# Patient Record
Sex: MALE | Race: WHITE | NOT HISPANIC OR LATINO | Employment: OTHER | URBAN - NONMETROPOLITAN AREA
[De-identification: names, ages, dates, MRNs, and addresses within clinical notes are randomized per-mention and may not be internally consistent; named-entity substitution may affect disease eponyms.]

---

## 2021-08-02 ENCOUNTER — APPOINTMENT (EMERGENCY)
Dept: CT IMAGING | Facility: HOSPITAL | Age: 61
End: 2021-08-02
Payer: COMMERCIAL

## 2021-08-02 ENCOUNTER — HOSPITAL ENCOUNTER (EMERGENCY)
Facility: HOSPITAL | Age: 61
Discharge: HOME | End: 2021-08-02
Attending: EMERGENCY MEDICINE
Payer: COMMERCIAL

## 2021-08-02 VITALS
RESPIRATION RATE: 20 BRPM | SYSTOLIC BLOOD PRESSURE: 155 MMHG | TEMPERATURE: 97.2 F | DIASTOLIC BLOOD PRESSURE: 74 MMHG | HEART RATE: 74 BPM | HEIGHT: 69 IN | OXYGEN SATURATION: 99 % | BODY MASS INDEX: 25.53 KG/M2 | WEIGHT: 172.4 LBS

## 2021-08-02 DIAGNOSIS — R91.1 LUNG NODULE SEEN ON IMAGING STUDY: ICD-10-CM

## 2021-08-02 DIAGNOSIS — R59.0 AXILLARY ADENOPATHY: ICD-10-CM

## 2021-08-02 DIAGNOSIS — I10 ESSENTIAL HYPERTENSION: ICD-10-CM

## 2021-08-02 DIAGNOSIS — C79.9 METASTATIC CANCER (HCC): ICD-10-CM

## 2021-08-02 DIAGNOSIS — M51.36 DDD (DEGENERATIVE DISC DISEASE), LUMBAR: ICD-10-CM

## 2021-08-02 DIAGNOSIS — R10.9 FLANK PAIN: Primary | ICD-10-CM

## 2021-08-02 DIAGNOSIS — M16.10 HIP ARTHRITIS: ICD-10-CM

## 2021-08-02 LAB
ALBUMIN SERPL BCP-MCNC: 3 G/DL (ref 3.5–5)
ALP SERPL-CCNC: 143 U/L (ref 46–116)
ALT SERPL W P-5'-P-CCNC: 21 U/L (ref 12–78)
ANION GAP SERPL CALCULATED.3IONS-SCNC: 10 MMOL/L (ref 4–13)
AST SERPL W P-5'-P-CCNC: 54 U/L (ref 5–45)
BASOPHILS # BLD AUTO: 0.03 THOUSANDS/ΜL (ref 0–0.1)
BASOPHILS NFR BLD AUTO: 1 % (ref 0–1)
BILIRUB SERPL-MCNC: 0.61 MG/DL (ref 0.2–1)
BILIRUB UR QL STRIP: NEGATIVE
BUN SERPL-MCNC: 10 MG/DL (ref 5–25)
CALCIUM ALBUM COR SERPL-MCNC: 10 MG/DL (ref 8.3–10.1)
CALCIUM SERPL-MCNC: 9.2 MG/DL (ref 8.3–10.1)
CHLORIDE SERPL-SCNC: 99 MMOL/L (ref 100–108)
CLARITY UR: CLEAR
CO2 SERPL-SCNC: 26 MMOL/L (ref 21–32)
COLOR UR: YELLOW
CREAT SERPL-MCNC: 0.73 MG/DL (ref 0.6–1.3)
EOSINOPHIL # BLD AUTO: 0.13 THOUSAND/ΜL (ref 0–0.61)
EOSINOPHIL NFR BLD AUTO: 2 % (ref 0–6)
ERYTHROCYTE [DISTWIDTH] IN BLOOD BY AUTOMATED COUNT: 12.7 % (ref 11.6–15.1)
GFR SERPL CREATININE-BSD FRML MDRD: 99 ML/MIN/1.73SQ M
GLUCOSE SERPL-MCNC: 104 MG/DL (ref 65–140)
GLUCOSE UR STRIP-MCNC: NEGATIVE MG/DL
HCT VFR BLD AUTO: 39.6 % (ref 36.5–49.3)
HGB BLD-MCNC: 13 G/DL (ref 12–17)
HGB UR QL STRIP.AUTO: NEGATIVE
IMM GRANULOCYTES # BLD AUTO: 0.02 THOUSAND/UL (ref 0–0.2)
IMM GRANULOCYTES NFR BLD AUTO: 0 % (ref 0–2)
KETONES UR STRIP-MCNC: NEGATIVE MG/DL
LEUKOCYTE ESTERASE UR QL STRIP: NEGATIVE
LIPASE SERPL-CCNC: 97 U/L (ref 73–393)
LYMPHOCYTES # BLD AUTO: 1.4 THOUSANDS/ΜL (ref 0.6–4.47)
LYMPHOCYTES NFR BLD AUTO: 21 % (ref 14–44)
MAGNESIUM SERPL-MCNC: 2.2 MG/DL (ref 1.6–2.6)
MCH RBC QN AUTO: 30.1 PG (ref 26.8–34.3)
MCHC RBC AUTO-ENTMCNC: 32.8 G/DL (ref 31.4–37.4)
MCV RBC AUTO: 92 FL (ref 82–98)
MONOCYTES # BLD AUTO: 0.58 THOUSAND/ΜL (ref 0.17–1.22)
MONOCYTES NFR BLD AUTO: 9 % (ref 4–12)
NEUTROPHILS # BLD AUTO: 4.46 THOUSANDS/ΜL (ref 1.85–7.62)
NEUTS SEG NFR BLD AUTO: 67 % (ref 43–75)
NITRITE UR QL STRIP: NEGATIVE
NRBC BLD AUTO-RTO: 0 /100 WBCS
PH UR STRIP.AUTO: 6.5 [PH]
PLATELET # BLD AUTO: 190 THOUSANDS/UL (ref 149–390)
PMV BLD AUTO: 10.3 FL (ref 8.9–12.7)
POTASSIUM SERPL-SCNC: 5.2 MMOL/L (ref 3.5–5.3)
PROT SERPL-MCNC: 8.4 G/DL (ref 6.4–8.2)
PROT UR STRIP-MCNC: NEGATIVE MG/DL
RBC # BLD AUTO: 4.32 MILLION/UL (ref 3.88–5.62)
SODIUM SERPL-SCNC: 135 MMOL/L (ref 136–145)
SP GR UR STRIP.AUTO: <=1.005 (ref 1–1.03)
UROBILINOGEN UR QL STRIP.AUTO: 0.2 E.U./DL
WBC # BLD AUTO: 6.62 THOUSAND/UL (ref 4.31–10.16)

## 2021-08-02 PROCEDURE — 85025 COMPLETE CBC W/AUTO DIFF WBC: CPT | Performed by: EMERGENCY MEDICINE

## 2021-08-02 PROCEDURE — 96361 HYDRATE IV INFUSION ADD-ON: CPT

## 2021-08-02 PROCEDURE — 96374 THER/PROPH/DIAG INJ IV PUSH: CPT

## 2021-08-02 PROCEDURE — 36415 COLL VENOUS BLD VENIPUNCTURE: CPT | Performed by: EMERGENCY MEDICINE

## 2021-08-02 PROCEDURE — 81003 URINALYSIS AUTO W/O SCOPE: CPT | Performed by: EMERGENCY MEDICINE

## 2021-08-02 PROCEDURE — G1004 CDSM NDSC: HCPCS

## 2021-08-02 PROCEDURE — 96375 TX/PRO/DX INJ NEW DRUG ADDON: CPT

## 2021-08-02 PROCEDURE — 74177 CT ABD & PELVIS W/CONTRAST: CPT

## 2021-08-02 PROCEDURE — 99285 EMERGENCY DEPT VISIT HI MDM: CPT | Performed by: EMERGENCY MEDICINE

## 2021-08-02 PROCEDURE — 83690 ASSAY OF LIPASE: CPT | Performed by: EMERGENCY MEDICINE

## 2021-08-02 PROCEDURE — 83735 ASSAY OF MAGNESIUM: CPT | Performed by: EMERGENCY MEDICINE

## 2021-08-02 PROCEDURE — 80053 COMPREHEN METABOLIC PANEL: CPT | Performed by: EMERGENCY MEDICINE

## 2021-08-02 PROCEDURE — 99284 EMERGENCY DEPT VISIT MOD MDM: CPT

## 2021-08-02 RX ORDER — LIDOCAINE 50 MG/G
2 PATCH TOPICAL ONCE
Status: DISCONTINUED | OUTPATIENT
Start: 2021-08-02 | End: 2021-08-02 | Stop reason: HOSPADM

## 2021-08-02 RX ORDER — ONDANSETRON 2 MG/ML
4 INJECTION INTRAMUSCULAR; INTRAVENOUS ONCE
Status: COMPLETED | OUTPATIENT
Start: 2021-08-02 | End: 2021-08-02

## 2021-08-02 RX ORDER — HYDRALAZINE HYDROCHLORIDE 20 MG/ML
10 INJECTION INTRAMUSCULAR; INTRAVENOUS ONCE
Status: COMPLETED | OUTPATIENT
Start: 2021-08-02 | End: 2021-08-02

## 2021-08-02 RX ORDER — OXYCODONE HYDROCHLORIDE AND ACETAMINOPHEN 5; 325 MG/1; MG/1
1 TABLET ORAL EVERY 4 HOURS PRN
Qty: 8 TABLET | Refills: 0 | Status: SHIPPED | OUTPATIENT
Start: 2021-08-02 | End: 2021-08-12

## 2021-08-02 RX ORDER — KETOROLAC TROMETHAMINE 30 MG/ML
15 INJECTION, SOLUTION INTRAMUSCULAR; INTRAVENOUS ONCE
Status: COMPLETED | OUTPATIENT
Start: 2021-08-02 | End: 2021-08-02

## 2021-08-02 RX ORDER — LABETALOL 20 MG/4 ML (5 MG/ML) INTRAVENOUS SYRINGE
10 ONCE
Status: COMPLETED | OUTPATIENT
Start: 2021-08-02 | End: 2021-08-02

## 2021-08-02 RX ADMIN — IOHEXOL 100 ML: 350 INJECTION, SOLUTION INTRAVENOUS at 09:56

## 2021-08-02 RX ADMIN — LABETALOL 20 MG/4 ML (5 MG/ML) INTRAVENOUS SYRINGE 10 MG: at 10:08

## 2021-08-02 RX ADMIN — LIDOCAINE 2 PATCH: 50 PATCH TOPICAL at 09:27

## 2021-08-02 RX ADMIN — ONDANSETRON 4 MG: 2 INJECTION INTRAMUSCULAR; INTRAVENOUS at 09:27

## 2021-08-02 RX ADMIN — SODIUM CHLORIDE 1000 ML: 0.9 INJECTION, SOLUTION INTRAVENOUS at 09:25

## 2021-08-02 RX ADMIN — HYDRALAZINE HYDROCHLORIDE 10 MG: 20 INJECTION INTRAMUSCULAR; INTRAVENOUS at 11:16

## 2021-08-02 RX ADMIN — KETOROLAC TROMETHAMINE 15 MG: 30 INJECTION, SOLUTION INTRAMUSCULAR at 09:25

## 2021-08-02 NOTE — ED PROVIDER NOTES
History  Chief Complaint   Patient presents with    Flank Pain     pt c/o worsening bilateral flank pain radiating to abdomen, frequent urination, nausea, and fatigue for over 2 wks  pt mentioned slipping on a step landing on back 2wks ago with continued flank pain  pt taking tylenol and advil w/o relief  denies travel/sob/fevers/cough/v/d      27-year-old male with a past medical history of hypertension presents with 2 months of bilateral low back pain that radiates to his bilateral flanks, right and left lower quadrant and to groin area at times  Patient states he has had been having worsening low back pain after falling 2 weeks ago and landing on his low back, no head strike  Patient also has had associated nausea and fatigue intermittently with frequent urination over the last 2 weeks  Patient states that he just got insurance yesterday and has a scheduled primary care physician appointment on August 4th but came to the emergency department today due to pain being 8/10  Patient has been taking Tylenol and Motrin at home without improvement  Patient denies previous history of back injury or surgery  No history of cancer, IV drug use, nephrolithiasis, pyelonephritis, diverticulitis,  pathology, abdominal aortic aneurysm  Patient denies recent fall, trauma or exertional activity  Patient denies fever, chills, dizziness, diaphoresis, loss of taste or smell, headache, neck stiffness, sore throat, cough, sputum production, vomiting, chest pain, shortness of breath, rash, penile discharge, scrotal swelling, focal motor or sensory deficits, lower extremity swelling or pain, diarrhea, bloody stools or constipation  Denies saddle anesthesia, urinary retention, urinary or fecal incontinence  Review of medical chart shows no recent hospitalizations  Patient is not on any medications          History provided by:  Patient and medical records   used: No    Flank Pain  Pain location:  L flank and R flank  Pain quality: pressure, sharp and stabbing    Pain radiates to:  R flank, groin, LUQ, RUQ and L flank  Pain severity:  Moderate  Onset quality:  Gradual  Duration:  8 weeks  Timing:  Intermittent  Progression:  Worsening  Chronicity:  Recurrent  Context: awakening from sleep    Context: not alcohol use, not diet changes, not eating, not laxative use, not medication withdrawal, not previous surgeries, not recent illness, not recent sexual activity, not recent travel, not retching, not sick contacts, not suspicious food intake and not trauma    Relieved by:  Nothing  Worsened by:  Nothing  Ineffective treatments:  Acetaminophen and NSAIDs  Associated symptoms: fatigue and nausea    Associated symptoms: no anorexia, no belching, no chest pain, no chills, no constipation, no cough, no diarrhea, no dysuria, no fever, no flatus, no hematemesis, no hematochezia, no hematuria, no melena, no shortness of breath, no sore throat and no vomiting    Fatigue:     Severity:  Unable to specify    Timing:  Unable to specify    Progression:  Unable to specify  Nausea:     Severity:  Unable to specify    Onset quality:  Unable to specify    Timing:  Unable to specify    Progression:  Unable to specify  Risk factors: being elderly and NSAID use    Risk factors: no alcohol abuse, no aspirin use, has not had multiple surgeries, not obese and no recent hospitalization        None       History reviewed  No pertinent past medical history  History reviewed  No pertinent surgical history  History reviewed  No pertinent family history  I have reviewed and agree with the history as documented      E-Cigarette/Vaping    E-Cigarette Use Never User      E-Cigarette/Vaping Substances     Social History     Tobacco Use    Smoking status: Never Smoker    Smokeless tobacco: Never Used   Vaping Use    Vaping Use: Never used   Substance Use Topics    Alcohol use: Not Currently    Drug use: Never       Review of Systems Constitutional: Positive for fatigue  Negative for chills, diaphoresis and fever  HENT: Negative  Negative for sore throat  Eyes: Negative  Respiratory: Negative for cough, chest tightness, shortness of breath and wheezing  Cardiovascular: Negative for chest pain, palpitations and leg swelling  Gastrointestinal: Positive for abdominal pain and nausea  Negative for anorexia, constipation, diarrhea, flatus, hematemesis, hematochezia, melena and vomiting  Endocrine: Negative  Genitourinary: Positive for frequency  Negative for decreased urine volume, difficulty urinating, discharge, dysuria, flank pain, hematuria, penile pain, penile swelling, scrotal swelling, testicular pain and urgency  Musculoskeletal: Positive for back pain  Negative for arthralgias, myalgias, neck pain and neck stiffness  Skin: Negative for rash  Allergic/Immunologic: Negative  Neurological: Negative for dizziness, tremors, seizures, syncope, facial asymmetry, speech difficulty, weakness, light-headedness, numbness and headaches  Hematological: Negative  Psychiatric/Behavioral: Negative for suicidal ideas  Physical Exam  Physical Exam  Vitals and nursing note reviewed  Exam conducted with a chaperone present  Constitutional:       General: He is not in acute distress  Appearance: Normal appearance  He is well-developed and normal weight  He is not ill-appearing, toxic-appearing or diaphoretic  Comments: Well appearing, in no acute distress   HENT:      Head: Normocephalic and atraumatic  Jaw: There is normal jaw occlusion  Right Ear: Hearing, tympanic membrane, ear canal and external ear normal  There is no impacted cerumen  No mastoid tenderness  No hemotympanum  Left Ear: Hearing, tympanic membrane, ear canal and external ear normal  There is no impacted cerumen  No mastoid tenderness  No hemotympanum  Nose: Nose normal       Right Nostril: No epistaxis        Left Nostril: No epistaxis  Right Sinus: No maxillary sinus tenderness or frontal sinus tenderness  Left Sinus: No maxillary sinus tenderness or frontal sinus tenderness  Mouth/Throat:      Lips: Pink  No lesions  Mouth: Mucous membranes are moist  No lacerations or angioedema  Tongue: No lesions  Tongue does not deviate from midline  Palate: No mass and lesions  Pharynx: Oropharynx is clear  Uvula midline  No pharyngeal swelling, oropharyngeal exudate, posterior oropharyngeal erythema or uvula swelling  Tonsils: No tonsillar exudate or tonsillar abscesses  Eyes:      General: Lids are normal  Vision grossly intact  Gaze aligned appropriately  No visual field deficit or scleral icterus  Right eye: No discharge  Left eye: No discharge  Extraocular Movements: Extraocular movements intact  Right eye: No nystagmus  Left eye: No nystagmus  Conjunctiva/sclera: Conjunctivae normal       Right eye: Right conjunctiva is not injected  Left eye: Left conjunctiva is not injected  Pupils: Pupils are equal, round, and reactive to light  Neck:      Thyroid: No thyroid mass or thyromegaly  Trachea: Trachea and phonation normal    Cardiovascular:      Rate and Rhythm: Normal rate and regular rhythm  Pulses: Normal pulses  Radial pulses are 2+ on the right side and 2+ on the left side  Dorsalis pedis pulses are 2+ on the right side and 2+ on the left side  Heart sounds: Normal heart sounds, S1 normal and S2 normal    Pulmonary:      Effort: Pulmonary effort is normal  No tachypnea, accessory muscle usage, respiratory distress or retractions  Breath sounds: Normal breath sounds and air entry  No stridor or decreased air movement  No decreased breath sounds, wheezing, rhonchi or rales  Chest:      Chest wall: No tenderness  Abdominal:      General: Abdomen is flat  Bowel sounds are normal  There is no distension  Palpations: Abdomen is soft  Abdomen is not rigid  There is no mass  Tenderness: There is no abdominal tenderness  There is right CVA tenderness and left CVA tenderness  There is no guarding or rebound  Negative signs include Collins's sign and McBurney's sign  Hernia: A hernia is present  There is no hernia in the left inguinal area or right inguinal area  Genitourinary:     Penis: Normal and circumcised  Testes: Normal    Musculoskeletal:         General: Tenderness present  No swelling, deformity or signs of injury  Normal range of motion  Cervical back: Normal, full passive range of motion without pain, normal range of motion and neck supple  No rigidity  No spinous process tenderness or muscular tenderness  Thoracic back: Normal       Lumbar back: Spasms and tenderness present  No swelling, edema, deformity, signs of trauma, lacerations or bony tenderness  Normal range of motion  Negative right straight leg raise test and negative left straight leg raise test  No scoliosis  Back:       Right lower leg: No edema  Left lower leg: No edema  Comments: Reproducible tenderness to bilateral CVA and bilateral low back with no overlying skin discoloration, erythema, bruising, ecchymosis; no spinal tenderness, crepitus or step-off; no tenderness to sacroiliac joint spaces bilaterally with negative straight leg test bilaterally; no mass or fluctuance   Lymphadenopathy:      Cervical: No cervical adenopathy  Lower Body: No right inguinal adenopathy  No left inguinal adenopathy  Skin:     General: Skin is warm and dry  Capillary Refill: Capillary refill takes less than 2 seconds  Coloration: Skin is not ashen, cyanotic, jaundiced, mottled, pale or sallow  Findings: No abrasion, abscess, acne, bruising, burn, ecchymosis, erythema, signs of injury, laceration, lesion, petechiae, rash or wound  Rash is not macular or papular     Neurological:      General: No focal deficit present  Mental Status: He is alert and oriented to person, place, and time  Mental status is at baseline  He is not disoriented  GCS: GCS eye subscore is 4  GCS verbal subscore is 5  GCS motor subscore is 6  Cranial Nerves: Cranial nerves are intact  No cranial nerve deficit, dysarthria or facial asymmetry  Sensory: Sensation is intact  No sensory deficit  Motor: Motor function is intact  No weakness, tremor, atrophy, abnormal muscle tone or seizure activity  Coordination: Coordination is intact  Coordination normal       Gait: Gait is intact  Gait normal       Comments: Patient is AAOx4, GCS 15; speaking clearly and appropriately; motor and sensation intact; visual fields intact; cranial nerves II-XII grossly intact; no facial droop, slurred speech or arm drift   Psychiatric:         Attention and Perception: Attention and perception normal  He is attentive  Mood and Affect: Mood and affect normal          Speech: Speech normal          Behavior: Behavior normal  Behavior is cooperative  Thought Content:  Thought content normal          Cognition and Memory: Cognition and memory normal          Judgment: Judgment normal          Vital Signs  ED Triage Vitals [08/02/21 0901]   Temperature Pulse Respirations Blood Pressure SpO2   (!) 97 2 °F (36 2 °C) 89 17 (!) 214/115 99 %      Temp Source Heart Rate Source Patient Position - Orthostatic VS BP Location FiO2 (%)   Temporal Monitor Lying Left arm --      Pain Score       9           Vitals:    08/02/21 1015 08/02/21 1030 08/02/21 1100 08/02/21 1151   BP: (!) 186/91 (!) 188/84 (!) 171/98 155/74   Pulse: 68 71 75 74   Patient Position - Orthostatic VS:   Lying Lying         Visual Acuity      ED Medications  Medications   ondansetron (ZOFRAN) injection 4 mg (4 mg Intravenous Given 8/2/21 0927)   sodium chloride 0 9 % bolus 1,000 mL (0 mL Intravenous Stopped 8/2/21 1055)   ketorolac (TORADOL) injection 15 mg (15 mg Intravenous Given 8/2/21 0925)   Labetalol HCl (NORMODYNE) injection 10 mg (10 mg Intravenous Given 8/2/21 1008)   iohexol (OMNIPAQUE) 350 MG/ML injection (SINGLE-DOSE) 100 mL (100 mL Intravenous Given 8/2/21 0956)   hydrALAZINE (APRESOLINE) injection 10 mg (10 mg Intravenous Given 8/2/21 1116)       Diagnostic Studies  Results Reviewed     Procedure Component Value Units Date/Time    Comprehensive metabolic panel [[id number]]  (Abnormal) Collected: 08/02/21 0921    Lab Status: Final result Specimen: Blood from Arm, Right Updated: 08/02/21 0951     Sodium 135 mmol/L      Potassium 5 2 mmol/L      Chloride 99 mmol/L      CO2 26 mmol/L      ANION GAP 10 mmol/L      BUN 10 mg/dL      Creatinine 0 73 mg/dL      Glucose 104 mg/dL      Calcium 9 2 mg/dL      Corrected Calcium 10 0 mg/dL      AST 54 U/L      ALT 21 U/L      Alkaline Phosphatase 143 U/L      Total Protein 8 4 g/dL      Albumin 3 0 g/dL      Total Bilirubin 0 61 mg/dL      eGFR 99 ml/min/1 73sq m     Narrative:      [place] guidelines for Chronic Kidney Disease (CKD):     Stage 1 with normal or high GFR (GFR > 90 mL/min/1 73 square meters)    Stage 2 Mild CKD (GFR = 60-89 mL/min/1 73 square meters)    Stage 3A Moderate CKD (GFR = 45-59 mL/min/1 73 square meters)    Stage 3B Moderate CKD (GFR = 30-44 mL/min/1 73 square meters)    Stage 4 Severe CKD (GFR = 15-29 mL/min/1 73 square meters)    Stage 5 End Stage CKD (GFR <15 mL/min/1 73 square meters)  Note: GFR calculation is accurate only with a steady state creatinine    Lipase [[id number]]  (Normal) Collected: 08/02/21 0921    Lab Status: Final result Specimen: Blood from Arm, Right Updated: 08/02/21 0951     Lipase 97 u/L     Magnesium [[id number]]  (Normal) Collected: 08/02/21 0921    Lab Status: Final result Specimen: Blood from Arm, Right Updated: 08/02/21 0951     Magnesium 2 2 mg/dL     UA w Reflex to Microscopic w Reflex to Culture [[id number]] Collected: 08/02/21 4381 Lab Status: Final result Specimen: Urine, Clean Catch Updated: 08/02/21 0928     Color, UA Yellow     Clarity, UA Clear     Specific Gravity, UA <=1 005     pH, UA 6 5     Leukocytes, UA Negative     Nitrite, UA Negative     Protein, UA Negative mg/dl      Glucose, UA Negative mg/dl      Ketones, UA Negative mg/dl      Urobilinogen, UA 0 2 E U /dl      Bilirubin, UA Negative     Blood, UA Negative    CBC and differential [[id number]] Collected: 08/02/21 0921    Lab Status: Final result Specimen: Blood from Arm, Right Updated: 08/02/21 0927     WBC 6 62 Thousand/uL      RBC 4 32 Million/uL      Hemoglobin 13 0 g/dL      Hematocrit 39 6 %      MCV 92 fL      MCH 30 1 pg      MCHC 32 8 g/dL      RDW 12 7 %      MPV 10 3 fL      Platelets 273 Thousands/uL      nRBC 0 /100 WBCs      Neutrophils Relative 67 %      Immat GRANS % 0 %      Lymphocytes Relative 21 %      Monocytes Relative 9 %      Eosinophils Relative 2 %      Basophils Relative 1 %      Neutrophils Absolute 4 46 Thousands/µL      Immature Grans Absolute 0 02 Thousand/uL      Lymphocytes Absolute 1 40 Thousands/µL      Monocytes Absolute 0 58 Thousand/µL      Eosinophils Absolute 0 13 Thousand/µL      Basophils Absolute 0 03 Thousands/µL                  CT Abdomen pelvis with contrast   Final Result by Luster Collet, MD (08/02 1057)      Patient has widespread metastatic disease involving lungs, liver, and intra-abdominal and retroperitoneal lymph nodes and body wall implants and 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  Tissue sampling is suggested for definitive diagnosis  Mild nonspecific thickening of the wall of the rectosigmoid colon, degenerative arthritis of the hips and spine as additional incidental findings               I personally discussed this study with Denise Weber on 8/2/2021 at 10:57 AM                      Workstation performed: [id number]         CT recon only lumbar spine   Final Result by Luster Collet, MD (08/02 1100)      Degenerative changes of several disks and facet joints without critical central canal or compressive neural foraminal stenosis  No lytic or blastic bone lesions or fractures are seen  Workstation performed: [id number]                    Procedures  Procedures         ED Course  ED Course as of Aug 03 0850   Mon Aug 02, 2021   1058 Texted Dr Kashmir Cruz, Hospitalist to ask whether we can admit patient here to start cancer work-up or should I transfer out now  1107 CT lumbar recon:IMPRESSION:     Degenerative changes of several disks and facet joints without critical central canal or compressive neural foraminal stenosis  No lytic or blastic bone lesions or fractures are seen  1107 CTAP:IMPRESSION:     Patient has widespread metastatic disease involving lungs, liver, and intra-abdominal and retroperitoneal lymph nodes and body wall implants and 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  Tissue sampling is suggested for definitive diagnosis      Mild nonspecific thickening of the wall of the rectosigmoid colon, degenerative arthritis of the hips and spine as additional incidental findings            I personally discussed this study with Sherlyn Shelley on 8/2/2021 at 10:57 AM                   1147 Reassessed patient  He has no complaints at this time  Reviewed labs and imaging  Had lengthy conversation with patient regarding his concerning findings on CT abdomen pelvis for metastatic cancer  Offered option for transfer to hospital for higher level of care and oncology evaluation  Patient declined and would like to go home and discuss options with his primary care provider with whom he has an appointment on August 4th  Patient is requesting referral to medical oncologist locally  Patient is alert and oriented x4 and has capacity to make this decision    As patient is not critically ill or hemodynamically unstable at this time, option for discharge is reasonable and patient does not need to sign out against medical advice  Patient was informed that his situation is serious and that he should not delay in follow-up with oncologist   Will provide script for Percocet as patient has persistent pain now identified likely secondary to his metastatic cancer  Family member will drive patient home  Plan for discharge with primary care provider follow-up  Work note provided  Strict return to ER precautions discussed with and acknowledged by patient  SBIRT 20yo+      Most Recent Value   SBIRT (24 yo +)   In order to provide better care to our patients, we are screening all of our patients for alcohol and drug use  Would it be okay to ask you these screening questions?   No Filed at: 08/02/2021 0905                    [facility]  Number of Diagnoses or Management Options     Amount and/or Complexity of Data Reviewed  Clinical lab tests: reviewed and ordered  Tests in the radiology section of CPT®: reviewed and ordered  Tests in the medicine section of CPT®: ordered and reviewed  Review and summarize past medical records: yes  Discuss the patient with other providers: yes (Hospitalist, Dr Sierra Melendrez)  Independent visualization of images, tracings, or specimens: yes (CTAP)    Risk of Complications, Morbidity, and/or Mortality  Presenting problems: moderate  Diagnostic procedures: moderate  Management options: moderate    Patient Progress  Patient progress: stable      Disposition  Final diagnoses:   Flank pain   Essential hypertension   Metastatic cancer ([facility] [address] )   Lung nodule seen on imaging study   Axillary adenopathy   DDD (degenerative disc disease), lumbar   Hip arthritis     Time reflects when diagnosis was documented in both MDM as applicable and the Disposition within this note     Time User Action Codes Description Comment    8/2/2021 11:08 AM Robert Mondragon Add [R10 9] Flank pain     8/2/2021 11:08 AM Robert Mondragon Add [I10] Essential hypertension 8/2/2021 11:08 AM Rochele Felisha Add [C79 9] Metastatic cancer ([address] )     8/2/2021 11:33 AM Rochele Felisha Add [R91 1] Lung nodule seen on imaging study     8/2/2021 11:34 AM Rochele Felisha Add [R59 0] Axillary adenopathy     8/2/2021 11:34 AM Rochele Felisha Add [M51 36] DDD (degenerative disc disease), lumbar     8/2/2021 11:35 AM Rochele Felisha Add [M16 10] Hip arthritis       ED Disposition     ED Disposition Condition Date/Time Comment    Discharge Stable Mon Aug 2, 2021 11:33 AM Dean Colón discharge to home/self care  Follow-up Information     Follow up With Specialties Details Why Jay Ford 20, DO Internal Medicine Go on 8/4/2021 Please follow-up with your primary care provider on August 4th to discuss your CT scan findings concerning for metastatic cancer and to have your blood pressure evaluated for hypertension   [address]  [phone]            Discharge Medication List as of 8/2/2021 11:39 AM      START taking these medications    Details   oxyCODONE-acetaminophen (PERCOCET) 5-325 mg per tablet Take 1 tablet by mouth every 4 (four) hours as needed for moderate pain for up to 10 daysMax Daily Amount: 6 tablets, Starting Mon 8/2/2021, Until Thu 8/12/2021 at 2359, Normal               PDMP Review       Value Time User    PDMP Reviewed  Yes 8/2/2021 11:35 AM Heather Li MD          ED Provider  Electronically Signed by      MD Heather Vazquez MD  08/03/21 7475 Admitted

## 2021-08-02 NOTE — Clinical Note
Arnold Sloan was seen and treated in our emergency department on 8/2/2021  Diagnosis:     Margaret Mcwilliams  may return to work on return date  He may return on this date: 08/03/2021         If you have any questions or concerns, please don't hesitate to call        Nallely German MD    ______________________________           _______________          _______________  Hospital Representative                              Date                                Time

## 2021-08-02 NOTE — Clinical Note
Dean Eldon was seen and treated in our emergency department on 8/2/2021  Diagnosis:     Robb Aw  may return to work on return date  He may return on this date: 08/03/2021         If you have any questions or concerns, please don't hesitate to call        Heather Li MD    ______________________________           _______________          _______________  Hospital Representative                              Date                                Time